# Patient Record
Sex: FEMALE | Race: BLACK OR AFRICAN AMERICAN | NOT HISPANIC OR LATINO | Employment: UNEMPLOYED | URBAN - METROPOLITAN AREA
[De-identification: names, ages, dates, MRNs, and addresses within clinical notes are randomized per-mention and may not be internally consistent; named-entity substitution may affect disease eponyms.]

---

## 2024-06-22 ENCOUNTER — HOSPITAL ENCOUNTER (EMERGENCY)
Facility: HOSPITAL | Age: 29
Discharge: HOME | End: 2024-06-22
Attending: STUDENT IN AN ORGANIZED HEALTH CARE EDUCATION/TRAINING PROGRAM
Payer: MEDICARE

## 2024-06-22 ENCOUNTER — APPOINTMENT (OUTPATIENT)
Dept: RADIOLOGY | Facility: HOSPITAL | Age: 29
End: 2024-06-22
Payer: MEDICARE

## 2024-06-22 VITALS
BODY MASS INDEX: 29.57 KG/M2 | RESPIRATION RATE: 20 BRPM | HEIGHT: 58 IN | WEIGHT: 140.87 LBS | SYSTOLIC BLOOD PRESSURE: 156 MMHG | TEMPERATURE: 99 F | HEART RATE: 83 BPM | OXYGEN SATURATION: 99 % | DIASTOLIC BLOOD PRESSURE: 115 MMHG
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
## 2024-06-22 PROCEDURE — 96372 THER/PROPH/DIAG INJ SC/IM: CPT

## 2024-06-22 PROCEDURE — 99283 EMERGENCY DEPT VISIT LOW MDM: CPT | Mod: 25

## 2024-06-22 PROCEDURE — 2500000004 HC RX 250 GENERAL PHARMACY W/ HCPCS (ALT 636 FOR OP/ED)

## 2024-06-22 PROCEDURE — 90471 IMMUNIZATION ADMIN: CPT

## 2024-06-22 PROCEDURE — 73090 X-RAY EXAM OF FOREARM: CPT | Mod: LT

## 2024-06-22 PROCEDURE — 13121 CMPLX RPR S/A/L 2.6-7.5 CM: CPT

## 2024-06-22 PROCEDURE — 2500000001 HC RX 250 WO HCPCS SELF ADMINISTERED DRUGS (ALT 637 FOR MEDICARE OP)

## 2024-06-22 PROCEDURE — 90715 TDAP VACCINE 7 YRS/> IM: CPT

## 2024-06-22 PROCEDURE — 73090 X-RAY EXAM OF FOREARM: CPT | Mod: LEFT SIDE | Performed by: RADIOLOGY

## 2024-06-22 RX ORDER — AMOXICILLIN AND CLAVULANATE POTASSIUM 875; 125 MG/1; MG/1
1 TABLET, FILM COATED ORAL ONCE
Status: COMPLETED | OUTPATIENT
Start: 2024-06-22 | End: 2024-06-22

## 2024-06-22 RX ORDER — AMOXICILLIN AND CLAVULANATE POTASSIUM 875; 125 MG/1; MG/1
1 TABLET, FILM COATED ORAL EVERY 12 HOURS
Qty: 20 TABLET | Refills: 0 | Status: SHIPPED | OUTPATIENT
Start: 2024-06-22 | End: 2024-07-02

## 2024-06-22 RX ORDER — OXYCODONE AND ACETAMINOPHEN 5; 325 MG/1; MG/1
1 TABLET ORAL EVERY 6 HOURS PRN
Qty: 12 TABLET | Refills: 0 | Status: SHIPPED | OUTPATIENT
Start: 2024-06-22 | End: 2024-06-25

## 2024-06-22 RX ADMIN — TETANUS TOXOID, REDUCED DIPHTHERIA TOXOID AND ACELLULAR PERTUSSIS VACCINE, ADSORBED 0.5 ML: 5; 2.5; 8; 8; 2.5 SUSPENSION INTRAMUSCULAR at 16:45

## 2024-06-22 RX ADMIN — HYDROMORPHONE HYDROCHLORIDE 0.5 MG: 1 INJECTION, SOLUTION INTRAMUSCULAR; INTRAVENOUS; SUBCUTANEOUS at 18:05

## 2024-06-22 RX ADMIN — HYDROMORPHONE HYDROCHLORIDE 0.5 MG: 1 INJECTION, SOLUTION INTRAMUSCULAR; INTRAVENOUS; SUBCUTANEOUS at 16:45

## 2024-06-22 RX ADMIN — AMOXICILLIN AND CLAVULANATE POTASSIUM 1 TABLET: 875; 125 TABLET, FILM COATED ORAL at 16:45

## 2024-06-22 ASSESSMENT — PAIN SCALES - GENERAL
PAINLEVEL_OUTOF10: 10 - WORST POSSIBLE PAIN
PAINLEVEL_OUTOF10: 10 - WORST POSSIBLE PAIN

## 2024-06-22 ASSESSMENT — COLUMBIA-SUICIDE SEVERITY RATING SCALE - C-SSRS
6. HAVE YOU EVER DONE ANYTHING, STARTED TO DO ANYTHING, OR PREPARED TO DO ANYTHING TO END YOUR LIFE?: NO
1. IN THE PAST MONTH, HAVE YOU WISHED YOU WERE DEAD OR WISHED YOU COULD GO TO SLEEP AND NOT WAKE UP?: NO
2. HAVE YOU ACTUALLY HAD ANY THOUGHTS OF KILLING YOURSELF?: NO

## 2024-06-22 ASSESSMENT — PAIN DESCRIPTION - LOCATION: LOCATION: ARM

## 2024-06-22 ASSESSMENT — PAIN - FUNCTIONAL ASSESSMENT: PAIN_FUNCTIONAL_ASSESSMENT: 0-10

## 2024-06-22 ASSESSMENT — PAIN DESCRIPTION - ORIENTATION: ORIENTATION: LEFT

## 2024-06-22 NOTE — ED PROVIDER NOTES
HPI   Chief Complaint   Patient presents with    Animal Bite     Pt sustained a dog bite on her left forearm about 10 minutes ago.  Has a 3 inch lac.  Bleeding controlled.        HPI  Patient is a 29-year-old otherwise healthy female who presents to ED for dog bite on the left forearm that occurred just prior to arrival.  Patient has a significant laceration on the forearm.  Leading is controlled with direct pressure.  Patient states she will need updated tetanus.  Patient states it was her neighbors dog who bit her, she believes the dog is vaccinated.  Patient denies any other injuries.                  [place] Coma Scale Score: 15                     Patient History   Past Medical History:   Diagnosis Date    Other specified health status     Last menstrual period (LMP) > 10 days ago     No past surgical history on file.  No family history on file.  Social History     Tobacco Use    Smoking status: Not on file    Smokeless tobacco: Not on file   Substance Use Topics    Alcohol use: Not on file    Drug use: Not on file       Physical Exam   ED Triage Vitals [06/22/24 1551]   Temperature Heart Rate Respirations BP   37.2 °C (99 °F) 83 20 (!) 156/115      Pulse Ox Temp src Heart Rate Source Patient Position   99 % -- -- --      BP Location FiO2 (%)     -- --       Physical Exam  Vitals reviewed.   Constitutional:       Appearance: Normal appearance.   HENT:      Head: Normocephalic and atraumatic.   Eyes:      Extraocular Movements: Extraocular movements intact.   Cardiovascular:      Rate and Rhythm: Normal rate and regular rhythm.   Pulmonary:      Effort: Pulmonary effort is normal.      Breath sounds: Normal breath sounds.   Abdominal:      General: Abdomen is flat.      Palpations: Abdomen is soft.      Tenderness: There is no abdominal tenderness.   Musculoskeletal:         General: Normal range of motion.      Cervical back: Normal range of motion and neck supple.   Skin:     General: Skin is warm and dry.       Comments: Laceration of the left forearm   Neurological:      General: No focal deficit present.      Mental Status: She is alert and oriented to person, place, and time.   Psychiatric:         Mood and Affect: Mood normal.         Behavior: Behavior normal.         ED Course & MDM   Diagnoses as of 06/22/24 1841   Dog bite, initial encounter       Medical Decision Making  Parts of this chart have been completed using voice recognition software. Please excuse any errors of transcription.  My thought process and reason for plan has been formulated from the time that I saw the patient until the time of disposition and is not specific to one specific moment during their visit and furthermore my MDM encompasses this entire chart and not only this text box.    HPI:   Detailed above.    Exam:   A medically appropriate exam performed, outlined above, given the known history and presentation.    History obtained from:   Patient    EKG/Cardiac monitor:     Social Determinants of Health considered during this visit:   Housing, Family or social support    Medications given during visit:  Medications   HYDROmorphone (Dilaudid) injection 0.5 mg (0.5 mg intramuscular Given 6/22/24 1645)   diphth,pertus(acell),tetanus (BoostRIX) 2.5-8-5 Lf-mcg-Lf/0.5mL vaccine 0.5 mL (0.5 mL intramuscular Given 6/22/24 1645)   amoxicillin-pot clavulanate (Augmentin) 875-125 mg per tablet 1 tablet (1 tablet oral Given 6/22/24 1645)   HYDROmorphone (Dilaudid) injection 0.5 mg (0.5 mg intramuscular Given 6/22/24 1805)        Diagnostic/tests:  Labs Reviewed - No data to display   XR forearm left 2 views   Final Result   Soft tissue injury ulnar aspect left distal forearm. No osseous   abnormality seen.        Signed by: Oleg Soni 6/22/2024 5:12 PM   Dictation workstation:   [id number]           Differential Diagnosis:       Consultations:      Procedures:      Critical Care:      Referrals:      Discharge Prescriptions:      MDM Summary:  Wound  was irrigated copiously. Laceration approximated loosely.  See procedure note for more detail.  Pain is well-controlled.  Patient is safe for discharge with PCP follow-up in 1 week.    We have discussed the diagnosis and risks, and we agree with discharging home to follow-up with appropriate physician as directed. We also discussed returning to the Emergency Department immediately if new or worsening symptoms occur. We have discussed the symptoms which are most concerning that necessitate immediate return. Pt symptoms have been well controlled here and the patient is safe for discharge with appropriate outpatient follow up. The patient has verbalized understanding to return to ER without delay for new or worsening pains or for any other symptoms or concerns. I utilized the discharge clinical management tool provided [employer] to help estimate risk of negative outcome for this patient.      Disposition:  The patient was discharged        Procedure  Laceration Repair    Performed by: Alan Neri PA-C  Authorized by: La Nena Phoenix MD    Consent:     Consent obtained:  Verbal    Consent given by:  Patient    Risks, benefits, and alternatives were discussed: yes      Risks discussed:  Infection and pain    Alternatives discussed:  No treatment  Universal protocol:     Procedure explained and questions answered to patient or proxy's satisfaction: yes      Patient identity confirmed:  Verbally with patient  Anesthesia:     Anesthesia method:  Local infiltration    Local anesthetic:  Lidocaine 1% w/o epi  Laceration details:     Location:  Shoulder/arm    Shoulder/arm location:  L lower arm    Length (cm):  7    Depth (mm):  10  Pre-procedure details:     Preparation:  Patient was prepped and draped in usual sterile fashion  Exploration:     Hemostasis achieved with:  Direct pressure    Wound exploration: entire depth of wound visualized      Contaminated: yes    Treatment:     Area cleansed with:   Chlorhexidine and saline    Amount of cleaning:  Extensive    Irrigation solution:  Sterile saline    Irrigation method:  Syringe    Visualized foreign bodies/material removed: no      Debridement:  None    Undermining:  Extensive  Skin repair:     Repair method:  Sutures    Number of sutures:  6  Approximation:     Approximation:  Loose  Repair type:     Repair type:  Complex  Post-procedure details:     Dressing:  Sterile dressing    Procedure completion:  Tolerated well, no immediate complications       Alan Neri PA-C  06/22/24 6479

## 2024-10-23 ENCOUNTER — HOSPITAL ENCOUNTER (EMERGENCY)
Facility: HOSPITAL | Age: 29
Discharge: HOME | End: 2024-10-23
Attending: EMERGENCY MEDICINE
Payer: MEDICAID

## 2024-10-23 VITALS
RESPIRATION RATE: 18 BRPM | OXYGEN SATURATION: 99 % | TEMPERATURE: 97.7 F | SYSTOLIC BLOOD PRESSURE: 119 MMHG | HEART RATE: 77 BPM | DIASTOLIC BLOOD PRESSURE: 86 MMHG

## 2024-10-23 DIAGNOSIS — R10.9 ABDOMINAL PAIN, UNSPECIFIED ABDOMINAL LOCATION: Primary | ICD-10-CM

## 2024-10-23 LAB
ALBUMIN SERPL BCP-MCNC: 4.2 G/DL (ref 3.4–5)
ALP SERPL-CCNC: 71 U/L (ref 33–110)
ALT SERPL W P-5'-P-CCNC: 9 U/L (ref 7–45)
ANION GAP SERPL CALC-SCNC: 13 MMOL/L (ref 10–20)
APPEARANCE UR: CLEAR
AST SERPL W P-5'-P-CCNC: 12 U/L (ref 9–39)
B-HCG SERPL-ACNC: <2 MIU/ML
BASOPHILS # BLD AUTO: 0.01 X10*3/UL (ref 0–0.1)
BASOPHILS NFR BLD AUTO: 0.1 %
BILIRUB SERPL-MCNC: 0.6 MG/DL (ref 0–1.2)
BILIRUB UR STRIP.AUTO-MCNC: NEGATIVE MG/DL
BUN SERPL-MCNC: 11 MG/DL (ref 6–23)
CALCIUM SERPL-MCNC: 8.6 MG/DL (ref 8.6–10.3)
CHLORIDE SERPL-SCNC: 102 MMOL/L (ref 98–107)
CO2 SERPL-SCNC: 27 MMOL/L (ref 21–32)
COLOR UR: YELLOW
CREAT SERPL-MCNC: 0.77 MG/DL (ref 0.5–1.05)
EGFRCR SERPLBLD CKD-EPI 2021: >90 ML/MIN/1.73M*2
EOSINOPHIL # BLD AUTO: 0 X10*3/UL (ref 0–0.7)
EOSINOPHIL NFR BLD AUTO: 0 %
ERYTHROCYTE [DISTWIDTH] IN BLOOD BY AUTOMATED COUNT: 13.7 % (ref 11.5–14.5)
GLUCOSE SERPL-MCNC: 94 MG/DL (ref 74–99)
GLUCOSE UR STRIP.AUTO-MCNC: NORMAL MG/DL
HCT VFR BLD AUTO: 38.9 % (ref 36–46)
HGB BLD-MCNC: 12.9 G/DL (ref 12–16)
IMM GRANULOCYTES # BLD AUTO: 0.03 X10*3/UL (ref 0–0.7)
IMM GRANULOCYTES NFR BLD AUTO: 0.3 % (ref 0–0.9)
KETONES UR STRIP.AUTO-MCNC: ABNORMAL MG/DL
LEUKOCYTE ESTERASE UR QL STRIP.AUTO: NEGATIVE
LIPASE SERPL-CCNC: 15 U/L (ref 9–82)
LYMPHOCYTES # BLD AUTO: 0.31 X10*3/UL (ref 1.2–4.8)
LYMPHOCYTES NFR BLD AUTO: 3.5 %
MCH RBC QN AUTO: 27 PG (ref 26–34)
MCHC RBC AUTO-ENTMCNC: 33.2 G/DL (ref 32–36)
MCV RBC AUTO: 82 FL (ref 80–100)
MONOCYTES # BLD AUTO: 0.26 X10*3/UL (ref 0.1–1)
MONOCYTES NFR BLD AUTO: 2.9 %
MUCOUS THREADS #/AREA URNS AUTO: NORMAL /LPF
NEUTROPHILS # BLD AUTO: 8.26 X10*3/UL (ref 1.2–7.7)
NEUTROPHILS NFR BLD AUTO: 93.2 %
NITRITE UR QL STRIP.AUTO: NEGATIVE
NRBC BLD-RTO: 0 /100 WBCS (ref 0–0)
PH UR STRIP.AUTO: 5.5 [PH]
PLATELET # BLD AUTO: 219 X10*3/UL (ref 150–450)
POTASSIUM SERPL-SCNC: 3.4 MMOL/L (ref 3.5–5.3)
PROT SERPL-MCNC: 7.5 G/DL (ref 6.4–8.2)
PROT UR STRIP.AUTO-MCNC: ABNORMAL MG/DL
RBC # BLD AUTO: 4.77 X10*6/UL (ref 4–5.2)
RBC # UR STRIP.AUTO: ABNORMAL /UL
RBC #/AREA URNS AUTO: NORMAL /HPF
SODIUM SERPL-SCNC: 139 MMOL/L (ref 136–145)
SP GR UR STRIP.AUTO: 1.03
SQUAMOUS #/AREA URNS AUTO: NORMAL /HPF
UROBILINOGEN UR STRIP.AUTO-MCNC: NORMAL MG/DL
WBC # BLD AUTO: 8.9 X10*3/UL (ref 4.4–11.3)
WBC #/AREA URNS AUTO: NORMAL /HPF

## 2024-10-23 PROCEDURE — 99284 EMERGENCY DEPT VISIT MOD MDM: CPT | Mod: 25

## 2024-10-23 PROCEDURE — 96374 THER/PROPH/DIAG INJ IV PUSH: CPT

## 2024-10-23 PROCEDURE — 83690 ASSAY OF LIPASE: CPT | Performed by: EMERGENCY MEDICINE

## 2024-10-23 PROCEDURE — 96375 TX/PRO/DX INJ NEW DRUG ADDON: CPT

## 2024-10-23 PROCEDURE — 36415 COLL VENOUS BLD VENIPUNCTURE: CPT | Performed by: EMERGENCY MEDICINE

## 2024-10-23 PROCEDURE — 85025 COMPLETE CBC W/AUTO DIFF WBC: CPT | Performed by: EMERGENCY MEDICINE

## 2024-10-23 PROCEDURE — 81001 URINALYSIS AUTO W/SCOPE: CPT | Performed by: EMERGENCY MEDICINE

## 2024-10-23 PROCEDURE — 84702 CHORIONIC GONADOTROPIN TEST: CPT | Performed by: EMERGENCY MEDICINE

## 2024-10-23 PROCEDURE — 80053 COMPREHEN METABOLIC PANEL: CPT | Performed by: EMERGENCY MEDICINE

## 2024-10-23 PROCEDURE — 2500000004 HC RX 250 GENERAL PHARMACY W/ HCPCS (ALT 636 FOR OP/ED): Performed by: EMERGENCY MEDICINE

## 2024-10-23 RX ORDER — KETOROLAC TROMETHAMINE 30 MG/ML
15 INJECTION, SOLUTION INTRAMUSCULAR; INTRAVENOUS ONCE
Status: COMPLETED | OUTPATIENT
Start: 2024-10-23 | End: 2024-10-23

## 2024-10-23 RX ORDER — DICYCLOMINE HYDROCHLORIDE 20 MG/1
20 TABLET ORAL 3 TIMES DAILY
Qty: 15 TABLET | Refills: 0 | Status: SHIPPED | OUTPATIENT
Start: 2024-10-23 | End: 2024-10-28

## 2024-10-23 RX ORDER — ONDANSETRON HYDROCHLORIDE 8 MG/1
8 TABLET, FILM COATED ORAL EVERY 8 HOURS PRN
Qty: 15 TABLET | Refills: 0 | Status: SHIPPED | OUTPATIENT
Start: 2024-10-23

## 2024-10-23 RX ORDER — OMEPRAZOLE 40 MG/1
40 CAPSULE, DELAYED RELEASE ORAL
Qty: 30 CAPSULE | Refills: 0 | Status: SHIPPED | OUTPATIENT
Start: 2024-10-23 | End: 2024-11-22

## 2024-10-23 RX ORDER — ONDANSETRON HYDROCHLORIDE 2 MG/ML
4 INJECTION, SOLUTION INTRAVENOUS ONCE
Status: COMPLETED | OUTPATIENT
Start: 2024-10-23 | End: 2024-10-23

## 2024-10-23 ASSESSMENT — PAIN DESCRIPTION - LOCATION: LOCATION: ABDOMEN

## 2024-10-23 ASSESSMENT — COLUMBIA-SUICIDE SEVERITY RATING SCALE - C-SSRS
1. IN THE PAST MONTH, HAVE YOU WISHED YOU WERE DEAD OR WISHED YOU COULD GO TO SLEEP AND NOT WAKE UP?: NO
2. HAVE YOU ACTUALLY HAD ANY THOUGHTS OF KILLING YOURSELF?: NO

## 2024-10-23 ASSESSMENT — PAIN - FUNCTIONAL ASSESSMENT: PAIN_FUNCTIONAL_ASSESSMENT: 0-10

## 2024-10-23 ASSESSMENT — PAIN SCALES - GENERAL
PAINLEVEL_OUTOF10: 0 - NO PAIN
PAINLEVEL_OUTOF10: 0 - NO PAIN
PAINLEVEL_OUTOF10: 5 - MODERATE PAIN

## 2024-10-23 NOTE — ED TRIAGE NOTES
"Pt arrives via triage with complaint of vomiting and back pain. Pt st onset of last night after \"eating a bunch of candy and junk food\". Pt denies any urinary sx, denies CP/SOB, also endorses abdominal pain. Denies fevers or diarrhea. Arrives a/o x4 and ambulatory.  "

## 2024-11-01 NOTE — ED PROVIDER NOTES
HPI   Chief Complaint   Patient presents with    Abdominal Pain    Vomiting       Chief complaint: Abdominal pain, vomiting    History of present illness: Patient is a 28-year-old female presenting to the emergency department with complaints of abdominal discomfort nausea and vomiting.  According to the patient, her symptoms began yesterday evening.  The patient states that during this period of time, she has been experiencing intermittent abdominal discomfort which she describes as diffuse.  Patient states that the pain is aching in nature.  The patient denies any fever with this she denies symptoms like this in the past.  The patient has been experiencing nausea and vomiting.  Patient admits that she ate a bunch of junk food yesterday and feels that this might be associated with her symptoms.  Concern, the patient presents to the emergency department for further evaluation      History provided by:  Patient   used: No            Patient History   Past Medical History:   Diagnosis Date    Other specified health status     Last menstrual period (LMP) > 10 days ago     No past surgical history on file.  No family history on file.  Social History     Tobacco Use    Smoking status: Not on file    Smokeless tobacco: Not on file   Substance Use Topics    Alcohol use: Not on file    Drug use: Not on file       Physical Exam   ED Triage Vitals [10/23/24 1302]   Temperature Heart Rate Respirations BP   36.5 °C (97.7 °F) 74 18 (!) 142/95      Pulse Ox Temp src Heart Rate Source Patient Position   99 % -- -- --      BP Location FiO2 (%)     -- --       Physical Exam  Constitutional:       Appearance: Normal appearance.   HENT:      Head: Normocephalic and atraumatic.      Right Ear: External ear normal.      Left Ear: External ear normal.      Nose: Nose normal.      Mouth/Throat:      Mouth: Mucous membranes are moist.   Eyes:      General: Lids are normal.      Extraocular Movements: Extraocular  movements intact.      Pupils: Pupils are equal, round, and reactive to light.   Cardiovascular:      Rate and Rhythm: Normal rate and regular rhythm.      Heart sounds: Normal heart sounds.   Pulmonary:      Effort: Pulmonary effort is normal.      Breath sounds: Normal breath sounds.   Abdominal:      General: Abdomen is flat.      Palpations: Abdomen is soft.      Tenderness: There is generalized abdominal tenderness. There is no guarding or rebound.   Musculoskeletal:         General: No deformity. Normal range of motion.      Cervical back: Normal range of motion and neck supple.   Skin:     General: Skin is warm.      Capillary Refill: Capillary refill takes less than 2 seconds.      Coloration: Skin is not jaundiced.   Neurological:      General: No focal deficit present.      Mental Status: She is alert and oriented to person, place, and time.   Psychiatric:         Mood and Affect: Mood normal.         Behavior: Behavior normal.           ED Course & MDM   Diagnoses as of 11/01/24 1830   Abdominal pain, unspecified abdominal location                 No data recorded     [place] Coma Scale Score: 15 (10/23/24 1644 : Corrie Negron RN)                           Medical Decision Making  Medical Decision Making: Patient remained stable during her time in the emergency department.  CBC demonstrated no significant abnormalities Chem-7 and LFTs were all within normal limits the patient's lipase and beta-hCG were within normal limits the patient's urinalysis demonstrated no significant abnormalities.    Patient presents to the emergency department with complaints of abdominal discomfort nausea and vomiting.  Workup was performed as above and demonstrated no significant abnormalities.  The patient was reassured the patient was given a dose of ketorolac and Zofran in the emergency department with improvement of her symptoms.  Given the patient's otherwise normal workup, I am comfortable the patient being discharged  home at this time.  She was given a prescription for Bentyl omeprazole and Zofran.  She was instructed to follow-up with the primary care physician if her symptoms do not improve and to return if she has worsening symptoms particularly fever.  Patient expressed understanding and agreement.  The patient was then discharged home in otherwise stable condition.    Amount and/or Complexity of Data Reviewed  Labs: ordered. Decision-making details documented in ED Course.        Procedure  Procedures     Yang Simmons MD  11/01/24 8991